# Patient Record
Sex: MALE | Race: WHITE | Employment: FULL TIME | URBAN - METROPOLITAN AREA
[De-identification: names, ages, dates, MRNs, and addresses within clinical notes are randomized per-mention and may not be internally consistent; named-entity substitution may affect disease eponyms.]

---

## 2020-02-16 ENCOUNTER — HOSPITAL ENCOUNTER (EMERGENCY)
Facility: CLINIC | Age: 55
Discharge: HOME | End: 2020-02-16
Attending: EMERGENCY MEDICINE | Admitting: EMERGENCY MEDICINE
Payer: COMMERCIAL

## 2020-02-16 VITALS
BODY MASS INDEX: 23.59 KG/M2 | HEIGHT: 73 IN | OXYGEN SATURATION: 99 % | HEART RATE: 77 BPM | TEMPERATURE: 97.9 F | RESPIRATION RATE: 16 BRPM | SYSTOLIC BLOOD PRESSURE: 120 MMHG | DIASTOLIC BLOOD PRESSURE: 60 MMHG | WEIGHT: 178 LBS

## 2020-02-16 DIAGNOSIS — L03.317 CELLULITIS OF BUTTOCK: ICD-10-CM

## 2020-02-16 PROCEDURE — 76882 US LMTD JT/FCL EVL NVASC XTR: CPT

## 2020-02-16 PROCEDURE — 25000132 ZZH RX MED GY IP 250 OP 250 PS 637: Performed by: EMERGENCY MEDICINE

## 2020-02-16 PROCEDURE — 99284 EMERGENCY DEPT VISIT MOD MDM: CPT | Mod: 25

## 2020-02-16 RX ORDER — HYDROCODONE BITARTRATE AND ACETAMINOPHEN 5; 325 MG/1; MG/1
1 TABLET ORAL EVERY 6 HOURS PRN
Qty: 10 TABLET | Refills: 0 | Status: SHIPPED | OUTPATIENT
Start: 2020-02-16 | End: 2020-02-19

## 2020-02-16 RX ORDER — CEPHALEXIN 500 MG/1
500 CAPSULE ORAL 4 TIMES DAILY
Qty: 40 CAPSULE | Refills: 0 | Status: SHIPPED | OUTPATIENT
Start: 2020-02-16 | End: 2020-02-22

## 2020-02-16 RX ORDER — CEPHALEXIN 500 MG/1
500 CAPSULE ORAL ONCE
Status: COMPLETED | OUTPATIENT
Start: 2020-02-16 | End: 2020-02-16

## 2020-02-16 RX ADMIN — CEPHALEXIN 500 MG: 500 CAPSULE ORAL at 08:10

## 2020-02-16 ASSESSMENT — MIFFLIN-ST. JEOR: SCORE: 1696.28

## 2020-02-16 ASSESSMENT — ENCOUNTER SYMPTOMS
FEVER: 0
WOUND: 1

## 2020-02-16 NOTE — ED AVS SNAPSHOT
Emergency Department  [address] [place] [id number]  Phone:  [phone]  Fax:  [phone]                                    Sky Elam   MRN: [id number]    Department:   Emergency Department   Date of Visit:  2/16/2020           After Visit Summary Signature Page    I have received my discharge instructions, and my questions have been answered. I have discussed any challenges I see with this plan with the nurse or doctor.    ..........................................................................................................................................  Patient/Patient Representative Signature      ..........................................................................................................................................  Patient Representative Print Name and Relationship to Patient    ..................................................               ................................................  Date                                   Time    ..........................................................................................................................................  Reviewed by Signature/Title    ...................................................              ..............................................  Date                                               Time          22EPIC Rev 08/18

## 2020-02-16 NOTE — ED PROVIDER NOTES
"  History     Chief Complaint:  Wound Check    The history is provided by the patient.      Sky Elam is a 54 year old male who presents for a wound check. The patient was seen at [facility] 4 days ago for cellulitis and possible abscess. They tried aspirating it but got no pus. He was started on Bactrim. The patient states the area has became hard and he has had increased pain. He denies any fevers.   Pt on Truvada for PrEP.    Allergies:  Penicillins     Medications:    Wellbutrin XL  Crestor  Truvada     Past Medical History:    Anxiety and Depression  High cholesterol    Past Surgical History:    Adenoidectomy    Family History:    Hypertension  Diabetes    Social History:  Patient is   Tobacco Use: No  Alcohol Use: Yes  PCP: Lukas Robert     Review of Systems   Constitutional: Negative for fever.   Skin: Positive for wound.   All other systems reviewed and are negative.    Physical Exam   First Vitals:  Patient Vitals for the past 24 hrs:   BP Temp Temp src Pulse Resp SpO2 Height Weight   02/16/20 0735 120/60 97.9  F (36.6  C) Oral 77 16 99 % 1.854 m (6' 1\") 80.7 kg (178 lb)     Physical Exam  General/Appearance: appears stated age, well-groomed, appears comfortable  Eyes: EOMI, no scleral injection, no icterus  ENT: MMM  MSK: THORNTON, good tone, no bony abnormality  Skin: warm and well-perfused, 4\"x4\" of erythema/warmth/ttp/induration to medial L buttock, no fluctuance  Neuro: GCS 15, alert and oriented, no gross focal neuro deficits  Heme: no petechia, no purpura, no active bleeding    Emergency Department Course     Imaging:  Radiographic findings were communicated with the patient who voiced understanding of the findings.  US POC Soft Tissue:  Cobblestoning without pocket of purulent material. Cellulitis but no abscess. Per my read    Interventions:  0810: Keflex 500mg PO    Emergency Department Course:  7:36 AM Nursing notes and vitals reviewed.  I performed an exam of the patient as documented " above.     8:19 AM Findings and plan explained to the patient. Patient discharged home with instructions regarding supportive care, medications, and reasons to return. The importance of close follow-up was reviewed.     Impression & Plan      Medical Decision Making:  This patient is a 54-year-old male who has been on Bactrim for the past 4 days for cellulitis of his left buttock who presents with concerns for worsening infection.  The redness has increased and he has had increased firmness and pain to the area.  He has had no fevers or other systemic symptoms.  Physical exam shows definitely continuation of the cellulitis.  Bedside ultrasound does not show any pocket of purulence that is amenable to drainage.  As he is already on Bactrim I think we need to better cover for strep.  We will start him on Keflex.  We will also send him home with some pain medication.    Diagnosis:    ICD-10-CM    1. Cellulitis of buttock L03.317        Disposition:  discharged to home    Discharge Medications:  New Prescriptions    CEPHALEXIN (KEFLEX) 500 MG CAPSULE    Take 1 capsule (500 mg) by mouth 4 times daily for 10 days    HYDROCODONE-ACETAMINOPHEN (NORCO) 5-325 MG TABLET    Take 1 tablet by mouth every 6 hours as needed for severe pain     I, Bradley Aasen, am serving as a scribe on 2/16/2020 at 7:47 AM to personally document services performed by Francia Byrd MD based on my observations and the provider's statements to me.          Francia Byrd MD  02/16/20 0857

## 2020-02-18 ENCOUNTER — HOSPITAL ENCOUNTER (EMERGENCY)
Facility: CLINIC | Age: 55
Discharge: HOME | End: 2020-02-18
Attending: PHYSICIAN ASSISTANT | Admitting: PHYSICIAN ASSISTANT
Payer: COMMERCIAL

## 2020-02-18 ENCOUNTER — APPOINTMENT (OUTPATIENT)
Dept: CT IMAGING | Facility: CLINIC | Age: 55
End: 2020-02-18
Attending: PHYSICIAN ASSISTANT
Payer: COMMERCIAL

## 2020-02-18 VITALS
SYSTOLIC BLOOD PRESSURE: 124 MMHG | RESPIRATION RATE: 16 BRPM | OXYGEN SATURATION: 99 % | DIASTOLIC BLOOD PRESSURE: 82 MMHG | TEMPERATURE: 98.3 F | HEART RATE: 85 BPM

## 2020-02-18 DIAGNOSIS — L03.317 CELLULITIS OF LEFT BUTTOCK: ICD-10-CM

## 2020-02-18 LAB
ANION GAP SERPL CALCULATED.3IONS-SCNC: 3 MMOL/L (ref 3–14)
BASOPHILS # BLD AUTO: 0 10E9/L (ref 0–0.2)
BASOPHILS NFR BLD AUTO: 0.5 %
BUN SERPL-MCNC: 16 MG/DL (ref 7–30)
CALCIUM SERPL-MCNC: 9.5 MG/DL (ref 8.5–10.1)
CHLORIDE SERPL-SCNC: 104 MMOL/L (ref 94–109)
CO2 SERPL-SCNC: 30 MMOL/L (ref 20–32)
CREAT BLD-MCNC: 1.4 MG/DL (ref 0.66–1.25)
CREAT SERPL-MCNC: 1.28 MG/DL (ref 0.66–1.25)
DIFFERENTIAL METHOD BLD: ABNORMAL
EOSINOPHIL # BLD AUTO: 0.3 10E9/L (ref 0–0.7)
EOSINOPHIL NFR BLD AUTO: 4 %
ERYTHROCYTE [DISTWIDTH] IN BLOOD BY AUTOMATED COUNT: 13.2 % (ref 10–15)
GFR SERPL CREATININE-BSD FRML MDRD: 53 ML/MIN/{1.73_M2}
GFR SERPL CREATININE-BSD FRML MDRD: 62 ML/MIN/{1.73_M2}
GLUCOSE SERPL-MCNC: 115 MG/DL (ref 70–99)
HCT VFR BLD AUTO: 40.2 % (ref 40–53)
HGB BLD-MCNC: 13.2 G/DL (ref 13.3–17.7)
IMM GRANULOCYTES # BLD: 0 10E9/L (ref 0–0.4)
IMM GRANULOCYTES NFR BLD: 0.3 %
LYMPHOCYTES # BLD AUTO: 2.5 10E9/L (ref 0.8–5.3)
LYMPHOCYTES NFR BLD AUTO: 31.4 %
MCH RBC QN AUTO: 30.7 PG (ref 26.5–33)
MCHC RBC AUTO-ENTMCNC: 32.8 G/DL (ref 31.5–36.5)
MCV RBC AUTO: 94 FL (ref 78–100)
MONOCYTES # BLD AUTO: 0.5 10E9/L (ref 0–1.3)
MONOCYTES NFR BLD AUTO: 6 %
NEUTROPHILS # BLD AUTO: 4.6 10E9/L (ref 1.6–8.3)
NEUTROPHILS NFR BLD AUTO: 57.8 %
NRBC # BLD AUTO: 0 10*3/UL
NRBC BLD AUTO-RTO: 0 /100
PLATELET # BLD AUTO: 326 10E9/L (ref 150–450)
POTASSIUM SERPL-SCNC: 4.7 MMOL/L (ref 3.4–5.3)
RBC # BLD AUTO: 4.3 10E12/L (ref 4.4–5.9)
SODIUM SERPL-SCNC: 137 MMOL/L (ref 133–144)
WBC # BLD AUTO: 8 10E9/L (ref 4–11)

## 2020-02-18 PROCEDURE — 87070 CULTURE OTHR SPECIMN AEROBIC: CPT | Performed by: PHYSICIAN ASSISTANT

## 2020-02-18 PROCEDURE — 25000128 H RX IP 250 OP 636: Performed by: PHYSICIAN ASSISTANT

## 2020-02-18 PROCEDURE — 85025 COMPLETE CBC W/AUTO DIFF WBC: CPT | Performed by: PHYSICIAN ASSISTANT

## 2020-02-18 PROCEDURE — 87077 CULTURE AEROBIC IDENTIFY: CPT | Performed by: PHYSICIAN ASSISTANT

## 2020-02-18 PROCEDURE — 72193 CT PELVIS W/DYE: CPT

## 2020-02-18 PROCEDURE — 99285 EMERGENCY DEPT VISIT HI MDM: CPT | Mod: 25

## 2020-02-18 PROCEDURE — 80048 BASIC METABOLIC PNL TOTAL CA: CPT | Performed by: PHYSICIAN ASSISTANT

## 2020-02-18 PROCEDURE — 87186 SC STD MICRODIL/AGAR DIL: CPT | Performed by: PHYSICIAN ASSISTANT

## 2020-02-18 PROCEDURE — 10060 I&D ABSCESS SIMPLE/SINGLE: CPT

## 2020-02-18 PROCEDURE — 82565 ASSAY OF CREATININE: CPT

## 2020-02-18 PROCEDURE — 25000125 ZZHC RX 250: Performed by: PHYSICIAN ASSISTANT

## 2020-02-18 RX ORDER — IOPAMIDOL 755 MG/ML
90 INJECTION, SOLUTION INTRAVASCULAR ONCE
Status: COMPLETED | OUTPATIENT
Start: 2020-02-18 | End: 2020-02-18

## 2020-02-18 RX ORDER — SULFAMETHOXAZOLE/TRIMETHOPRIM 800-160 MG
1 TABLET ORAL 2 TIMES DAILY
Qty: 14 TABLET | Refills: 0 | Status: SHIPPED | OUTPATIENT
Start: 2020-02-18 | End: 2020-02-22

## 2020-02-18 RX ADMIN — SODIUM CHLORIDE 67 ML: 9 INJECTION, SOLUTION INTRAVENOUS at 14:36

## 2020-02-18 RX ADMIN — IOPAMIDOL 90 ML: 755 INJECTION, SOLUTION INTRAVENOUS at 14:36

## 2020-02-18 ASSESSMENT — ENCOUNTER SYMPTOMS
FEVER: 0
WOUND: 1
VOMITING: 0

## 2020-02-18 NOTE — ED AVS SNAPSHOT
Emergency Department  [address] [place] [id number]  Phone:  [phone]  Fax:  [phone]                                    Sky Elam   MRN: [id number]    Department:   Emergency Department   Date of Visit:  2/18/2020           After Visit Summary Signature Page    I have received my discharge instructions, and my questions have been answered. I have discussed any challenges I see with this plan with the nurse or doctor.    ..........................................................................................................................................  Patient/Patient Representative Signature      ..........................................................................................................................................  Patient Representative Print Name and Relationship to Patient    ..................................................               ................................................  Date                                   Time    ..........................................................................................................................................  Reviewed by Signature/Title    ...................................................              ..............................................  Date                                               Time          22EPIC Rev 08/18

## 2020-02-18 NOTE — ED PROVIDER NOTES
History     Chief Complaint:  Wound Infection      HPI   Sky Elam is a 54 year old male recently seen at [facility] on 2/12/[facility] for cellulitis who presents for further evaluation of a left buttock wound. During the patient's visit at [facility], they tried aspirating the wound but no pus was removed. He was started on Bactrim, though returned to the ED on the 16th as he felt the area was becoming harder and more painful. At that time, he was prescribed Keflex and Norco and had an ultrasound, see below. Since then, he notes that the wound has improved and shrunk in size, though has began draining whitish pink discharge and blood. He initially went to clinic this morning and they called general surgery, though they were concerned given the location of the wound and they urged him to come into the hospital to possibly be evaluated by Colorectal surgery. He endorses no pain with bowel movements. Denies fever, emesis, or any other concerns. No known history of MRSA or similar abscesses. Patient is on Truvada for PrEP. No history of HIV. No tobacco, alcohol, or drug use.     US POC Soft Tissue:  Cobblestoning without pocket of purulent material. Cellulitis but no abscess.   As read by Dr. Byrd.     Allergies:  Penicillins     Medications:    Wellbutrin  Crestor   Truvada  Keflex  Norco     Past Medical History:    Anxiety and depression   HLD    Past Surgical History:    Adenoidectomy     Family History:    HTN  DM    Social History:  Smoking status: no   Alcohol use: yes   Drug use: no   PCP: Lukas Robert  Marital Status:  Single      Review of Systems   Constitutional: Negative for fever.   Gastrointestinal: Negative for vomiting.   Skin: Positive for wound.   All other systems reviewed and are negative.    Physical Exam     Patient Vitals for the past 24 hrs:   BP Temp Temp src Pulse Heart Rate Resp SpO2   02/18/20 1210 (!) 111/95 98.3  F (36.8  C) Oral 105 105 18 99 %      Physical  Exam  General: Resting comfortably.  Alert and oriented.   Head:  The scalp, face, and head appear normal   Eyes:  Conjunctivae and sclerae are normal    ENT:    The oropharynx is normal    Uvula is in the midline    Neck:  No lymphadenopathy  CV:  Regular rate and rhythm     Normal S1/S2  Resp:  Lungs are clear to auscultation    Non-labored    No rales or wheezing   GI:  Abdomen is soft, non-distended    No abdominal tenderness   MS:  Normal muscular tone   Skin:  There is induration to the left mid buttock and mild surrounding erythema and warmth. There is a mild amount of fluctuance. This does not appear to involve the perirectal area. No pain with HENRRY. No signs of necrotizing fasciitis. No crepitations.  Neuro: Speech is normal and fluent.     Emergency Department Course     Imaging:  Radiographic findings were communicated with the patient who voiced understanding of the findings.    CT Pelvis Soft Tissue w Contrast  1.  Subcutaneous edema involving the left gluteal cleft with  associated skin thickening. No evidence for discrete drainable abscess  formation. No evidence for air within the soft tissues. No evidence  for significant superior and anterior involvement into the  ischiorectal fossa or mesorectal fat planes within the lower pelvis.  2.  No evidence of bowel wall thickening or inflammatory change. No  free fluid in the pelvis.  As read by Radiology.    Laboratory:  Creatinine POCT: creatinine 1.4 (H), GFR 53 (L)  CBC: WBC 8.0, HGB 13.2 (L),    BMP: Glucose 115 (H), creatinine 1.28 (H), o/w WNL   Wound culture aerobic bacterial: pending      Procedures:    Incision and Drainage     LOCATIONS:  Left buttock      ANESTHESIA:  Local field block using Marcaine 0.5% with epinephrine, total of 2 mLs     PREPARATION:  Cleansed with Betadine     PROCEDURE:  Area was incised with # 11 Blade ([place]) with a small additional incision to the skin that was already draining.  Wound treatment included  Purulent Drainage.  I did not pack the area.  Appropriate dressing was applied to cover the area.    PATIENT STATUS:        Patient tolerated the procedure well. There were no complications.    Emergency Department Course:  1349: Nursing notes and vitals reviewed. I performed an exam of the patient as documented above.     Blood drawn. This was sent to the lab for further testing, results above.    The patient was sent for a pelvis CT while in the emergency department, findings above.     1525: I rechecked the patient and discussed the results of his workup thus far.     I drained the patient's wound, see above.     Findings and plan explained to the Patient. Patient discharged home with instructions regarding supportive care, medications, and reasons to return. The importance of close follow-up was reviewed.     I personally reviewed the laboratory results with the Patient and answered all related questions prior to discharge.      Impression & Plan      Medical Decision Making:  Sky Elam is a 54 year old male who presents to the ED for evaluation of left buttock wound.  The patient has been seen several times for this and is currently on Keflex and Bactrim.  The patient is afebrile, and well.  He states that overall his symptoms are improving vastly, but notes that the area to the left buttock is now draining, prompting his evaluation.  On exam, there is purulent drainage noted from the area to the left buttock.  There is no obvious perirectal involvement. Signs and symptoms consistent with cellulitis with an abscess.  Given the relative proximity to the rectal area, CT scan was performed.  This demonstrated edema to the left buttock, and left gluteal cleft without obvious abscess.  There is no involvement superiorly or anteriorly and there is no evidence of perirectal involvement.  The area to the patient's left buttock is currently draining.  I was able to express a bit more of purulent drainage after I opened  the area slightly more.  A wound culture was sent and pending.  Patient tolerated the procedure well.  There are no immediate complications.  No signs of serious infection like necrotizing fasciitis. The patient's symptoms are otherwise improving and the abscess is draining on its own, therefore I do not believe the patient needs to be admitted to the hospital.  He was advised on warm soaks 4 times daily for 3 to 5 days.  Overall, I believe the patient safe to discharge home.  He is asked to follow-up with the primary care doctor in the next 1 to 2 days for recheck.  He is asked return immediately for  for fevers, spreading redness, increased pain/swelling, or any other concerns.  Bactrim refill was prescribed. All questions were answered prior to discharge.  The patient understands and agrees this plan.      Diagnosis:    ICD-10-CM    1. Cellulitis of left buttock L03.317        Disposition:  discharged to home    Discharge Medications:  New Prescriptions    SULFAMETHOXAZOLE-TRIMETHOPRIM (BACTRIM DS) 800-160 MG PO TABLET    Take 1 tablet by mouth 2 times daily for 7 days     INoris, am serving as a scribe at 1:49 PM on 2/18/2020 to document services personally performed by Sue Stroud PA based on my observations and the provider's statements to me.       Noris Ortiz  2/18/2020    EMERGENCY DEPARTMENT       Sue Stroud PA-C  02/18/20 1912

## 2020-02-18 NOTE — ED TRIAGE NOTES
Pt has abscess and has been on abx since last Wednesday and had an abx added here on Sunday. Pt fee;ls it is getting worse

## 2020-02-18 NOTE — DISCHARGE INSTRUCTIONS
Discharge Instructions  Cellulitis    Cellulitis is an infection of the skin that occurs when bacteria enter the skin.   Symptoms are generally redness, swelling, warmth and pain.  Your infection appeared to be appropriate to treat at home with antibiotics.  However, sometimes your infection may be worse than it seemed at first, or may worsen with time. If you have new or worse symptoms, you may need to be seen again in the Emergency Department or by your primary provider.    Generally, every Emergency Department visit should have a follow-up clinic visit with either a primary or a specialty clinic/provider. Please follow-up as instructed by your emergency provider today.    Return to the Emergency Department if:  The redness, pain, or swelling gets a lot worse.  If the red area was marked, return if it is red significantly beyond the marked area.  You are unable to get your antibiotics, or are vomiting (throwing up) these pills, or you cannot take them.  You are feeling more ill, weak or lightheaded.  You start to run a new fever (temperature >101 F).  Anything else about the infection worries or concerns you.  Treatment:  Start your antibiotics right away, and take them as prescribed. Be sure to finish the whole prescription, even if you are better.  Apply a heating pad, warm packs, or warm water soaks to the infected area for 15 minutes at a time, at least 3 times a day. Do not use a heating pad on your feet or legs if you have diabetes. Do not sleep with a heating pad on, since this can cause burns or skin injury.  Rest your injured area for at least 1-2 days. After that you may start using your extremity again as long as there is not too much pain.   Raise the injured area above the level of your heart as much as possible in the first 1-2 days.  Tylenol  (acetaminophen), Motrin  (ibuprofen), or Advil  (ibuprofen) may help may help reduce pain and fever and may help you feel more comfortable. Be sure to read and  follow the package directions, and ask your provider if you have questions.    If you were given a prescription for medicine here today, be sure to read all of the information (including the package insert) that comes with your prescription.  This will include important information about the medicine, its side effects, and any warnings that you need to know about.  The pharmacist who fills the prescription can provide more information and answer questions you may have about the medicine.  If you have questions or concerns that the pharmacist cannot address, please call or return to the Emergency Department.     Remember that you can always come back to the Emergency Department if you are not able to see your regular provider in the amount of time listed above, if you get any new symptoms, or if there is anything that worries you.    Discharge Instructions  Boils or Abscesses, MRSA Skin infections    You have been treated today for a skin boil or abscess. A boil is an infection under the skin that causes a painful pus filled lump. Boils often start when bacteria infect a hair follicle, the place where a hair starts to grow.  The most common places that boils develop are on the face, neck, armpits, breasts, groin and buttocks. When they are small they can often be treated at home, but they can grow quickly, become very painful, and require medical attention.    Many of these infections are staph (pronounced  staff ) infections. Staph is a type of bacteria that commonly lives on skin. As many as 1 out of 3 people have staph that lives on their skin. Usually, it causes no problems, but if there is a cut or scrape, it can cause an infection. You have been treated today for an infection thought to be caused by MRSA, (pronounced  mursa ) which stands for methicillin resistant staph aureus. MRSA can be very difficult to treat. The antibiotics that were once used for skin infections do not work on MRSA, so alternative  medications may be prescribed.    Generally, every Emergency Department visit should have a follow-up clinic visit with either a primary or a specialty clinic/provider. Please follow-up as instructed by your emergency provider today.    Return to the Emergency Department if:  Your redness, pain, or swelling gets a lot worse.  You are unable to get or take the prescribed medications.  You are feeling more ill, weak or lightheaded.  You start to run a new fever (temperature >101 F).  Anything else about the infection worries or concerns you.  Treatment:  Incision and drainage (opening the boil with a scalpel to help the pus drain) or needle aspiration (removing pus with a syringe) is sometimes needed for larger abscesses. For many abscess, this alone is the treatment. A wick or packing is sometimes put in the wound to encourage ongoing drainage of infection from the area.  Please leave it in place for as long as instructed by your care provider or until your follow up wound check in 48 hours.  An antibiotic might be prescribed. If so, start taking it right away, and take them as prescribed. Be sure to finish the whole prescription, even if you are better.  Apply a heating pad, warm packs, or warm water soaks to the infected area for 15 minutes at a time, at least 3 times a day. Do not use a heating pad on your feet or legs if you have diabetes. Do not sleep with a heating pad on, since this can cause burns or skin injury.  Raise the affected area above the level of your heart as much as possible in the first 1-2 days.  Pain medication - You may take an over-the-counter pain medication such as Tylenol  (acetaminophen), Advil  (ibuprofen), Motrin  (ibuprofen) or Aleve  (naproxen).    Information about MRSA    How do you catch MRSA?  By touching a person who has MRSA on his or her skin.  By being nearby when a person with MRSA breathes, coughs, or sneezes.  By touching a table, handle, or other surface that has the germ  on it.  If the germ is on your skin and you cut yourself or have another injury, you can get infected.    How do I know if I have a MRSA infection? MRSA most commonly causes skin infections such as boils, red tender lumps that contain pus. Your physician may recognize MRSA from the appearance of your infection. Sometimes, your doctor may swab your skin or the drainage from a boil to test for MRSA or other bacteria.    Can MRSA be treated? Yes, certain medications are still effective in treating MRSA infections.  It is very important that you follow the directions exactly. Take ALL the pills you are given, even if you feel better before you finish the pills. If you do not take them all, the germ could come back even stronger and be harder to treat next time.  Is there any way to prevent MRSA?   Wash your hands frequently with soap and water.  Do not share towels, washcloths, razors or other personal care items.  Wipe down gym equipment before and after you use it.    If you develop a similar infection in the future, you can try to treat it at home:  Apply warm compresses to the affected area to promote drainage.  Wash hands frequently to prevent spread of infection.  Keep affected area covered to prevent spread of infection.  Never squeeze or pop boils.     When to seek medical attention for boils:  You develop a fever.  The area around the boil becomes red or red streaks develop.  Your pain becomes severe.  You develop swollen lymph nodes.  You have diabetes, a heart murmur, an immune disease like HIV or AIDS, you take corticosteroids for a medical condition, or you are on chemotherapy.  You develop a boil or abscess on your face, near your spine or near your rectal opening.  If you were given a prescription for medicine here today, be sure to read all of the information (including the package insert) that comes with your prescription.  This will include important information about the medicine, its side effects,  and any warnings that you need to know about.  The pharmacist who fills the prescription can provide more information and answer questions you may have about the medicine.  If you have questions or concerns that the pharmacist cannot address, please call or return to the Emergency Department.     Remember that you can always come back to the Emergency Department if you are not able to see your regular doctor in the amount of time listed above, if you get any new symptoms, or if there is anything that worries you.

## 2020-02-20 LAB
BACTERIA SPEC CULT: ABNORMAL
Lab: ABNORMAL
SPECIMEN SOURCE: ABNORMAL

## 2020-02-21 ENCOUNTER — TELEPHONE (OUTPATIENT)
Dept: EMERGENCY MEDICINE | Facility: CLINIC | Age: 55
End: 2020-02-21

## 2020-02-21 DIAGNOSIS — A49.02 MRSA INFECTION: ICD-10-CM

## 2020-02-21 NOTE — TELEPHONE ENCOUNTER
[facility] Emergency Department Lab result notification [Adult-Male]    [place] ED lab result protocol used  General Culture Protocol    Reason for call  Notify of lab results, assess symptoms,  review ED providers recommendations/discharge instructions (if necessary) and advise per ED lab result f/u protocol    Lab Result (including Rx patient on, if applicable)  Final Wound culture (left buttock) report on 2/20/20  Emergency Dept discharge antibiotic prescribed: Sulfamethoxazole-Trimethoprim (Bactrim DS, Septra DS) 800-160 mg PO tablet,  1 tablet by mouth 2 times daily for 7 days (started Keflex 2/16).  #1. Bacteria, Heavy growth Methicillin resistant Staphylococcus aureus, which is [RESISTANT] to antibiotics   Incision and Drainage performed in [place] ED [Yes / No]: Yes  Patient to be notified of result, symptoms assessed and advised per [place] ED lab result protocol.    Information table from ED Provider visit on 2/18/20  Symptoms reported at ED visit (Chief complaint, HPI) Sky Elam is a 54 year old male recently seen at [facility] on 2/12/20 and [facility] on 2/16/20 for cellulitis who presents for further evaluation of a left buttock wound. During the patient's visit at [facility], they tried aspirating the wound but no pus was removed. He was started on Bactrim, though returned to the ED on the 16th as he felt the area was becoming harder and more painful. At that time, he was prescribed Keflex and Norco and had an ultrasound, see below. Since then, he notes that the wound has improved and shrunk in size, though has began draining whitish pink discharge and blood. He initially went to clinic this morning and they called general surgery, though they were concerned given the location of the wound and they urged him to come into the hospital to possibly be evaluated by Colorectal surgery. He endorses no pain with bowel movements. Denies fever, emesis, or any other concerns. No known  history of MRSA or similar abscesses. Patient is on Truvada for PrEP. No history of HIV. No tobacco, alcohol, or drug use.    Significant Medical hx, if applicable (i.e. CKD, diabetes) None   Allergies Allergies   Allergen Reactions     Penicillins       Weight, if applicable Wt Readings from Last 2 Encounters:   02/16/20 80.7 kg (178 lb)   07/22/15 96.3 kg (212 lb 6.4 oz)      Coumadin/Warfarin [Yes /No] No   Creatinine Level (mg/dl) Creatinine   Date Value Ref Range Status   02/18/2020 1.28 (H) 0.66 - 1.25 mg/dL Final      Creatinine clearance (ml/min), if applicable Serum creatinine: 1.28 mg/dL (H) 02/18/20 1416  Estimated creatinine clearance: 74.4 mL/min (A)   ED providers Impression and Plan (applicable information) Sky Elam is a 54 year old male who presents to the ED for evaluation of left buttock wound.  The patient has been seen several times for this and is currently on Keflex and Bactrim.  The patient is afebrile, and well.  He states that overall his symptoms are improving vastly, but notes that the area to the left buttock is now draining, prompting his evaluation.  On exam, there is purulent drainage noted from the area to the left buttock.  There is no obvious perirectal involvement. Signs and symptoms consistent with cellulitis with an abscess.  Given the relative proximity to the rectal area, CT scan was performed.  This demonstrated edema to the left buttock, and left gluteal cleft without obvious abscess.  There is no involvement superiorly or anteriorly and there is no evidence of perirectal involvement.  The area to the patient's left buttock is currently draining.  I was able to express a bit more of purulent drainage after I opened the area slightly more.  A wound culture was sent and pending.  Patient tolerated the procedure well.  There are no immediate complications.  No signs of serious infection like necrotizing fasciitis. The patient's symptoms are otherwise improving and the abscess  is draining on its own, therefore I do not believe the patient needs to be admitted to the hospital.  He was advised on warm soaks 4 times daily for 3 to 5 days.  Overall, I believe the patient safe to discharge home.  He is asked to follow-up with the primary care doctor in the next 1 to 2 days for recheck.  He is asked return immediately for  for fevers, spreading redness, increased pain/swelling, or any other concerns.  Bactrim refill was prescribed. All questions were answered prior to discharge.  The patient understands and agrees this plan.     ED diagnosis  Cellulitis of left buttock      ED provider Sue Stroud PA-C      RN Assessment (Patient s current Symptoms), include time called.  [Insert Left message here if message left]  Left voicemail message requesting a call back to [facility] ED Lab Result RN at [phone].  RN is available every day between 10 a.m. and 6:30 p.m..        PCP follow-up Questions asked: YES       Leticia Frank RN    [facility]   Lung Nodule and ED Lab Results F/U RN  Epic pool (ED late result f/u RN) : [id number]   # [phone]    Copy of Lab result  Order   Wound Culture Aerobic Bacterial [[id number]] (Order [id number])   Order Requisition     Wound Culture Aerobic Bacterial (Order #[id number]) on 2/18/20   Exam Information     Exam Date Exam Time Accession # Results    2/18/20  4:14 PM [id number]    Component Results     Specimen Information: Buttock        Component Collected Lab   Specimen Description 02/18/2020  4:14    Buttock Wound    Special Requests 02/18/2020  4:14    Specimen collected in eSwab transport (white cap)    Culture Micro Abnormal  02/18/2020  4:14    Heavy growth   Methicillin resistant Staphylococcus aureus (MRSA)    Susceptibility     Methicillin resistant staphylococcus aureus (mrsa)     Antibiotic Interpretation Sensitivity Method Status   CLINDAMYCIN Sensitive <=0.25 ug/mL [facility] Final   ERYTHROMYCIN  Resistant >=8 ug/mL [facility] Final   GENTAMICIN Sensitive <=0.5 ug/mL [facility] Final   LINEZOLID Sensitive 2 ug/mL [facility] Final   OXACILLIN Resistant >=4 ug/mL [facility] Final   TETRACYCLINE Sensitive <=1 ug/mL [facility] Final   Trimethoprim/Sulfa Resistant 4/76 ug/mL [facility] Final   VANCOMYCIN Sensitive 1 ug/mL [facility] Final

## 2020-02-22 RX ORDER — CLINDAMYCIN HCL 300 MG
300 CAPSULE ORAL 4 TIMES DAILY
Qty: 28 CAPSULE | Refills: 0 | Status: SHIPPED | OUTPATIENT
Start: 2020-02-22 | End: 2020-02-29

## 2020-02-22 NOTE — TELEPHONE ENCOUNTER
RN Recommendations/Instructions per [place] ED lab result protocol  Patient notified of lab result and treatment recommendations.  Rx for Clindamycin (Cleocin) 300 mg PO capsule, 1 capsule (300 mg) by mouth 4 times daily for 7 days sent to [Pharmacy - [facility] in [place] on [address]].  RN reviewed information about MRSA from protocol and Epic reference.   Advised to follow up with PCP as directed by the ED provider.  The patient is comfortable with the information given and has no further questions.        Please Contact your PCP clinic or return to the Emergency department if your:    Symptoms worsen or other concerning symptom's.    PCP follow-up Questions asked: YES       [RN Name]  Joann Kelly RN  [facility] RN  Lung Nodule and ED Lab Result RN  Epic pool (ED late result f/u RN): [id number]  [facility] INCIDENTAL RADIOLOGY F/U NURSES: P [id number]  # [phone]

## 2020-02-22 NOTE — TELEPHONE ENCOUNTER
"[facility] Emergency Department Lab result notification [Adult-Male]    [place] ED lab result protocol used  General culture    Reason for call  Notify of lab results, assess symptoms,  review ED providers recommendations/discharge instructions (if necessary) and advise per ED lab result f/u protocol    Lab Result (including Rx patient on, if applicable)  Final Wound culture (left buttock) report on 2/20/20  Emergency Dept discharge antibiotic prescribed: Sulfamethoxazole-Trimethoprim (Bactrim DS, Septra DS) 800-160 mg PO tablet,  1 tablet by mouth 2 times daily for 7 days (started Keflex 2/16).  #1. Bacteria, Heavy growth Methicillin resistant Staphylococcus aureus, which is [RESISTANT] to antibiotics   Incision and Drainage performed in [place] ED [Yes / No]: Yes  Patient to be notified of result, symptoms assessed and advised per [place] ED lab result protocol.    Information table from ED Provider visit on 2/19/2020  Symptoms reported at ED visit (Chief complaint, HPI) See below   Significant Medical hx, if applicable (i.e. CKD, diabetes) None   Allergies Allergies   Allergen Reactions     Penicillins       Weight, if applicable Wt Readings from Last 2 Encounters:   02/16/20 80.7 kg (178 lb)   07/22/15 96.3 kg (212 lb 6.4 oz)      Coumadin/Warfarin [Yes /No] No   Creatinine Level (mg/dl) Creatinine   Date Value Ref Range Status   02/18/2020 1.28 (H) 0.66 - 1.25 mg/dL Final      Creatinine clearance (ml/min), if applicable Serum creatinine: 1.28 mg/dL (H) 02/18/20 1416  Estimated creatinine clearance: 74.4 mL/min (A)   ED providers Impression and Plan (applicable information) See below   ED diagnosis Cellulitis of left buttock   ED provider Sue Stroud PA-C      RN Assessment (Patient s current Symptoms), include time called.  [Insert Left message here if message left]  12:40PM: Spoke with patient. He states he is improving. Still has a silver dollar sized \"hard\" area around the " "wound, has some clear to slightly pink drainage from the wound, the skin is \"pink' not red. Denies any pain or fever.     RN Recommendations/Instructions per [place] ED lab result protocol  Patient notified of lab result and treatment recommendations.    Advised patient that I would consult with the ED provider and call him back.   [place] Emergency Department Provider Name & Recommendations (included time consulted)  1PM: All ED providers are busy.  1:17PM: Consulted with [facility] ED provider Dr Chad Trierweiler . Reviewed patient's history, current symptoms and culture results. Provider advises that the patient stop the Bactrim and Keflex and start Clindamycin 300mg 4 times a day for 7 days.        PCP follow-up Questions asked: YES       [RN Name]  Joann Kelly RN  [facility] RN  Lung Nodule and ED Lab Result RN  Epic pool (ED late result f/u RN): P [id number]  [facility] INCIDENTAL RADIOLOGY F/U NURSES: P [id number]  # [phone]        "